# Patient Record
Sex: MALE | Race: WHITE | Employment: FULL TIME | URBAN - NONMETROPOLITAN AREA
[De-identification: names, ages, dates, MRNs, and addresses within clinical notes are randomized per-mention and may not be internally consistent; named-entity substitution may affect disease eponyms.]

---
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
## 2022-05-19 ENCOUNTER — HOSPITAL ENCOUNTER (EMERGENCY)
Age: 22
Discharge: HOME | End: 2022-05-19
Attending: EMERGENCY MEDICINE
Payer: COMMERCIAL

## 2022-05-19 ENCOUNTER — APPOINTMENT (OUTPATIENT)
Dept: GENERAL RADIOLOGY | Age: 22
End: 2022-05-19
Payer: COMMERCIAL

## 2022-05-19 VITALS
SYSTOLIC BLOOD PRESSURE: 120 MMHG | TEMPERATURE: 98.3 F | DIASTOLIC BLOOD PRESSURE: 78 MMHG | OXYGEN SATURATION: 98 % | RESPIRATION RATE: 16 BRPM | HEART RATE: 78 BPM

## 2022-05-19 DIAGNOSIS — S80.11XA CONTUSION OF RIGHT KNEE AND LOWER LEG, INITIAL ENCOUNTER: ICD-10-CM
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
## 2022-05-19 DIAGNOSIS — S80.211A ABRASION OF RIGHT KNEE, INITIAL ENCOUNTER: ICD-10-CM

## 2022-05-19 DIAGNOSIS — S93.401A MODERATE RIGHT ANKLE SPRAIN, INITIAL ENCOUNTER: Primary | ICD-10-CM

## 2022-05-19 DIAGNOSIS — S80.01XA CONTUSION OF RIGHT KNEE AND LOWER LEG, INITIAL ENCOUNTER: ICD-10-CM

## 2022-05-19 PROCEDURE — 99213 OFFICE O/P EST LOW 20 MIN: CPT

## 2022-05-19 PROCEDURE — L4350 ANKLE CONTROL ORTHO PRE OTS: HCPCS

## 2022-05-19 PROCEDURE — 99214 OFFICE O/P EST MOD 30 MIN: CPT | Performed by: EMERGENCY MEDICINE

## 2022-05-19 PROCEDURE — 73610 X-RAY EXAM OF ANKLE: CPT

## 2022-05-19 RX ORDER — NAPROXEN 500 MG/1
500 TABLET ORAL 2 TIMES DAILY WITH MEALS
Qty: 20 TABLET | Refills: 0 | Status: SHIPPED | OUTPATIENT
Start: 2022-05-19 | End: 2022-05-29

## 2022-05-19 ASSESSMENT — ENCOUNTER SYMPTOMS
SHORTNESS OF BREATH: 0
VOICE CHANGE: 0
WHEEZING: 0
COUGH: 0
DIARRHEA: 0
BACK PAIN: 0
SORE THROAT: 0
ABDOMINAL PAIN: 0
VOMITING: 0
NAUSEA: 0
SINUS PRESSURE: 0
EYE PAIN: 0
TROUBLE SWALLOWING: 0
STRIDOR: 0
EYE REDNESS: 0
COLOR CHANGE: 1
EYE DISCHARGE: 0

## 2022-05-19 ASSESSMENT — PAIN - FUNCTIONAL ASSESSMENT: PAIN_FUNCTIONAL_ASSESSMENT: 0-10

## 2022-05-19 ASSESSMENT — PAIN DESCRIPTION - PAIN TYPE: TYPE: ACUTE PAIN

## 2022-05-19 ASSESSMENT — PAIN DESCRIPTION - FREQUENCY: FREQUENCY: CONTINUOUS

## 2022-05-19 ASSESSMENT — PAIN DESCRIPTION - ORIENTATION: ORIENTATION: RIGHT

## 2022-05-19 ASSESSMENT — PAIN DESCRIPTION - DESCRIPTORS: DESCRIPTORS: ACHING

## 2022-05-19 ASSESSMENT — PAIN DESCRIPTION - LOCATION: LOCATION: ANKLE

## 2022-05-19 ASSESSMENT — PAIN SCALES - GENERAL: PAINLEVEL_OUTOF10: 9

## 2022-05-19 NOTE — ED TRIAGE NOTES
Gloria Ding arrives to room with complaint of left ankle injury symptoms started today. Gloria Ding was a work for the city of [address] [employer] when he twisted his ankle injuring it. He has pain and swelling in r ankle.

## 2022-05-19 NOTE — ED PROVIDER NOTES
[address]       Chief Complaint   Patient presents with    Ankle Injury     right       Nurses Notes reviewed and I agree except as noted in the HPI. HISTORY OF PRESENT ILLNESS   Earl Rodrigez is a 22 y.o. male who presents 2 hours after he sustained a twisting injury to the right ankle, falling to the ground and landing on his right knee. He has pain and abrasions right knee as well. Patient rates right ankle pain at 9 out of 10 in severity mostly in the lateral aspect. Some medially as well. No head neck or back injury. No deformity, laceration, instability. No previous fracture right lower extremity. Up-to-date tetanus    REVIEW OF SYSTEMS     Review of Systems   Constitutional: Negative for appetite change, chills, fatigue, fever and unexpected weight change. Normal appetite no fever   HENT: Negative for congestion, ear discharge, ear pain, sinus pressure, sneezing, sore throat, trouble swallowing and voice change. No face or head injury   Eyes: Negative for pain, discharge and redness. No redness or drainage   Respiratory: Negative for cough, shortness of breath, wheezing and stridor. No cough or shortness of breath   Cardiovascular: Negative for chest pain and leg swelling. No chest pain or syncope   Gastrointestinal: Negative for abdominal pain, diarrhea, nausea and vomiting. No abdominal pain or vomiting   Genitourinary: Negative for dysuria, frequency, hematuria and urgency. Musculoskeletal: Negative for arthralgias, back pain, myalgias and neck pain. Twisting injury right ankle with pain and swelling. Direct blow right knee with abrasions and contusions   Skin: Positive for color change and wound. Negative for rash. Abrasions right knee no active bleeding   Neurological: Negative for dizziness, syncope, weakness and headaches.         No headache or head injury Hematological: Negative for adenopathy. Psychiatric/Behavioral: Negative for behavioral problems, confusion, sleep disturbance and suicidal ideas. The patient is not nervous/anxious. red and bold elements reviewed    PAST MEDICAL HISTORY         Diagnosis Date    Penile abnormality     Psoriasis        SURGICAL HISTORY     Patient  has a past surgical history that includes Circumcision and Tympanostomy tube placement. CURRENT MEDICATIONS       Previous Medications    No medications on file       ALLERGIES     Patient is has No Known Allergies. FAMILY HISTORY     Patient'sfamily history includes High Blood Pressure in his father; No Known Problems in his mother. SOCIAL HISTORY     Patient  reports that he has never smoked. He has never used smokeless tobacco. He reports that he does not drink alcohol and does not use drugs. PHYSICAL EXAM     ED TRIAGE VITALS  BP: 120/78, Temp: 98.3 °F (36.8 °C), Pulse: 78, Resp: 16, SpO2: 98 %  Physical Exam  Vitals and nursing note reviewed. Constitutional:       General: He is not in acute distress. Appearance: He is well-developed. He is not ill-appearing. Comments: Moist membranes normal airway   HENT:      Head: Normocephalic and atraumatic. Right Ear: External ear normal.      Left Ear: External ear normal.      Nose: Nose normal.      Mouth/Throat:      Pharynx: No oropharyngeal exudate. Comments: Oropharynx normal  Eyes:      General: No scleral icterus. Right eye: No discharge. Left eye: No discharge. Extraocular Movements:      Right eye: Normal extraocular motion. Left eye: Normal extraocular motion. Conjunctiva/sclera: Conjunctivae normal.      Pupils: Pupils are equal, round, and reactive to light. Comments: Conjunctiva clear orbits atraumatic   Neck:      Thyroid: No thyromegaly. Vascular: No JVD.       Comments: No meningismus  Cardiovascular:      Rate and Rhythm: Normal rate and regular rhythm. Pulses: Normal pulses. Heart sounds: Normal heart sounds, S1 normal and S2 normal. No murmur heard. No friction rub. No gallop. Comments: No murmur  Pulmonary:      Effort: Pulmonary effort is normal. No tachypnea or respiratory distress. Breath sounds: Normal breath sounds. No stridor. No decreased breath sounds, wheezing, rhonchi or rales. Comments: No Cough chest nontender lungs clear  Chest:      Chest wall: No tenderness. Abdominal:      General: Bowel sounds are normal. There is no distension. Palpations: Abdomen is soft. There is no mass. Tenderness: There is no abdominal tenderness. There is no right CVA tenderness, left CVA tenderness, guarding or rebound. Comments: Soft nontender   Musculoskeletal:         General: Normal range of motion. Cervical back: Normal range of motion. Right knee: Swelling present. Tenderness present. No medial joint line tenderness. Left knee: Normal.      Right ankle: Swelling present. Tenderness present over the lateral malleolus, ATF ligament, AITF ligament and CF ligament. No posterior TF ligament or base of 5th metatarsal tenderness. Right Achilles Tendon: Normal.      Left ankle: Normal.      Left Achilles Tendon: Normal.      Comments: Abrasions contusions right knee. No bony tenderness. No active bleeding. Distal neurovascular intact   Lymphadenopathy:      Cervical: No cervical adenopathy. Right cervical: No superficial cervical adenopathy. Left cervical: No superficial cervical adenopathy. Skin:     General: Skin is warm and dry. Findings: No erythema or rash. Comments: Abrasions right knee. No bleeding no foreign body. Neurological:      Mental Status: He is alert and oriented to person, place, and time. Cranial Nerves: No cranial nerve deficit. Motor: No abnormal muscle tone.       Coordination: Coordination normal.      Deep Tendon Reflexes: Reflexes are normal and symmetric. Reflexes normal.      Comments: Appropriate, no focal finding. Distal neurovascular intact right lower extremity   Psychiatric:         Behavior: Behavior normal.         Thought Content: Thought content normal.         Judgment: Judgment normal.         DIAGNOSTIC RESULTS   Labs: No results found for this visit on 05/19/22. IMAGING:  XR ANKLE RIGHT (MIN 3 VIEWS)   Final Result      No fracture or dislocation. Final report electronically signed by Dr. Deacon Elmore on 5/19/2022 11:45 AM        URGENT CARE COURSE:     Vitals:    05/19/22 1131   BP: 120/78   Pulse: 78   Resp: 16   Temp: 98.3 °F (36.8 °C)   TempSrc: Temporal   SpO2: 98%       Medications - No data to display  PROCEDURES:  None  FINALIMPRESSION      1. Moderate right ankle sprain, initial encounter    2. Abrasion of right knee, initial encounter    3. Contusion of right knee and lower leg, initial encounter    4. Fall from ground level        DISPOSITION/PLAN   DISPOSITION Decision To Discharge 05/19/2022 12:25:40 PM  Nontoxic, well-hydrated, normal airway. No radiographic evidence of fracture, dislocation, foreign body, arthritis. No infectious process. No neurovascular complication. No associated head, neck or back injury. Patient has right ankle sprain and contusion/abrasion of right knee. Will treat with RICE treatment using ankle air splint, crutches, Ace wrap to right knee, Naprosyn, Tylenol. Patient to follow-up with outpatient occupational health in 4 days for recheck. Written work restrictions completed and given to patient for desk duty and crutch walking until follow-up with occupational health on Monday morning.   PATIENT REFERRED TO:  [address] [place] [id number]  [phone]  Schedule an appointment as soon as possible for a visit in 4 days  Follow-up 9:30 AM Monday occupational health    DISCHARGE MEDICATIONS:  New Prescriptions    NAPROXEN (NAPROSYN) 500 MG TABLET Take 1 tablet by mouth 2 times daily (with meals) for 20 doses     Current Discharge Medication List          MD Polly Gonzalez MD  05/19/22 3881

## 2023-11-26 ENCOUNTER — HOSPITAL ENCOUNTER (EMERGENCY)
Age: 23
Discharge: HOME | End: 2023-11-26
Payer: COMMERCIAL

## 2023-11-26 VITALS
WEIGHT: 235.8 LBS | TEMPERATURE: 98.3 F | HEART RATE: 60 BPM | DIASTOLIC BLOOD PRESSURE: 77 MMHG | SYSTOLIC BLOOD PRESSURE: 124 MMHG | OXYGEN SATURATION: 98 % | RESPIRATION RATE: 16 BRPM

## 2023-11-26 DIAGNOSIS — J32.9 RHINOSINUSITIS: Primary | ICD-10-CM

## 2023-11-26 DIAGNOSIS — H10.9 CONJUNCTIVITIS OF LEFT EYE, UNSPECIFIED CONJUNCTIVITIS TYPE: ICD-10-CM

## 2023-11-26 LAB — S PYO AG THROAT QL: NEGATIVE

## 2023-11-26 PROCEDURE — 87651 STREP A DNA AMP PROBE: CPT

## 2023-11-26 PROCEDURE — 99213 OFFICE O/P EST LOW 20 MIN: CPT | Performed by: EMERGENCY MEDICINE

## 2023-11-26 PROCEDURE — 99213 OFFICE O/P EST LOW 20 MIN: CPT

## 2023-11-26 RX ORDER — AMOXICILLIN AND CLAVULANATE POTASSIUM 875; 125 MG/1; MG/1
1 TABLET, FILM COATED ORAL 2 TIMES DAILY
Qty: 14 TABLET | Refills: 0 | Status: SHIPPED | OUTPATIENT
Start: 2023-11-26 | End: 2023-12-03

## 2023-11-26 ASSESSMENT — ENCOUNTER SYMPTOMS
EYE REDNESS: 1
EYE DISCHARGE: 1
SINUS PAIN: 1
RHINORRHEA: 1
SORE THROAT: 1
SINUS PRESSURE: 1
SHORTNESS OF BREATH: 0
COUGH: 1

## 2023-11-26 ASSESSMENT — PAIN SCALES - GENERAL: PAINLEVEL_OUTOF10: 7

## 2023-11-26 ASSESSMENT — PAIN - FUNCTIONAL ASSESSMENT: PAIN_FUNCTIONAL_ASSESSMENT: 0-10

## 2023-11-26 ASSESSMENT — PAIN DESCRIPTION - LOCATION: LOCATION: THROAT

## 2023-11-26 NOTE — DISCHARGE INSTRUCTIONS
Continue DayQuil/NyQuil for treatment of symptoms    Augmentin as directed until gone    Drink plenty fluids    Good handwashing    Follow-up primary care provider return here if no improvement of symptoms in 3 to 4 days.   Sooner if worse

## 2023-11-26 NOTE — ED PROVIDER NOTES
[address]  Urgent Care Encounter       CHIEF COMPLAINT       Chief Complaint   Patient presents with    Nasal Congestion    Eye Drainage       Nurses Notes reviewed and I agree except as noted in the HPI. HISTORY OF PRESENT ILLNESS   Jesika Florez is a 26 y.o. male who presents for complaints of sinus congestion pressure, sore throat, postnasal drip. Symptoms have been present for 4 to 5 days. He has been using DayQuil and NyQuil for treatment of symptoms with some improvement. Patient woke up this morning with his left eye crusted shut and left eye irritation. He is concerned he now has pinkeye additionally. The patient goes to college in [place], [place]. He will be returning there today    HPI    REVIEW OF SYSTEMS     Review of Systems   Constitutional:  Negative for activity change, fatigue and fever. HENT:  Positive for congestion, rhinorrhea, sinus pressure, sinus pain and sore throat. Eyes:  Positive for discharge and redness. Respiratory:  Positive for cough. Negative for shortness of breath. Cardiovascular:  Negative for chest pain. PAST MEDICAL HISTORY         Diagnosis Date    Penile abnormality     Psoriasis        SURGICALHISTORY     Patient  has a past surgical history that includes Circumcision and Tympanostomy tube placement. CURRENT MEDICATIONS       Discharge Medication List as of 11/26/2023 11:14 AM        CONTINUE these medications which have NOT CHANGED    Details   naproxen (NAPROSYN) 500 MG tablet Take 1 tablet by mouth 2 times daily (with meals) for 20 doses, Disp-20 tablet, R-0Print             ALLERGIES     Patient is has No Known Allergies.     Patients   Immunization History   Administered Date(s) Administered    COVID-19, MODERNA BLUE border, Primary or Immunocompromised, (age 12y+), IM, 100 mcg/0.5mL 10/14/2021       FAMILY HISTORY     Patient's family history includes High Blood Pressure in his father; No Known Problems in his

## 2023-11-26 NOTE — ED TRIAGE NOTES
Latonia Mccallum arrives to room with complaint of  sore throat, sinus congestion, runny nose all started Thur, left eye pasted shut this morning when wake today      Using dayquil and nyquil which has not helped